# Patient Record
Sex: FEMALE | Race: BLACK OR AFRICAN AMERICAN | Employment: FULL TIME | URBAN - METROPOLITAN AREA
[De-identification: names, ages, dates, MRNs, and addresses within clinical notes are randomized per-mention and may not be internally consistent; named-entity substitution may affect disease eponyms.]

---

## 2017-01-30 ENCOUNTER — HOSPITAL ENCOUNTER (OUTPATIENT)
Dept: LAB | Age: 27
Discharge: HOME | End: 2017-01-30
Payer: OTHER GOVERNMENT

## 2017-01-30 ENCOUNTER — OFFICE VISIT (OUTPATIENT)
Dept: OBGYN CLINIC | Age: 27
End: 2017-01-30

## 2017-01-30 VITALS
BODY MASS INDEX: 38.64 KG/M2 | DIASTOLIC BLOOD PRESSURE: 88 MMHG | HEART RATE: 89 BPM | SYSTOLIC BLOOD PRESSURE: 148 MMHG | HEIGHT: 62 IN | WEIGHT: 210 LBS

## 2017-01-30 DIAGNOSIS — N93.9 ABNORMAL UTERINE BLEEDING (AUB): ICD-10-CM

## 2017-01-30 DIAGNOSIS — Z31.69 INFERTILITY COUNSELING: Primary | ICD-10-CM

## 2017-01-30 DIAGNOSIS — Z31.69 INFERTILITY COUNSELING: ICD-10-CM

## 2017-01-30 LAB
T4 FREE SERPL-MCNC: 1.4 NG/DL (ref 0.7–1.5)
TSH SERPL DL<=0.05 MIU/L-ACNC: 0.57 UIU/ML (ref 0.36–3.74)

## 2017-01-30 PROCEDURE — 84439 ASSAY OF FREE THYROXINE: CPT | Performed by: OBSTETRICS & GYNECOLOGY

## 2017-01-30 PROCEDURE — 84146 ASSAY OF PROLACTIN: CPT | Performed by: OBSTETRICS & GYNECOLOGY

## 2017-01-30 PROCEDURE — 83001 ASSAY OF GONADOTROPIN (FSH): CPT | Performed by: OBSTETRICS & GYNECOLOGY

## 2017-01-30 PROCEDURE — 82670 ASSAY OF TOTAL ESTRADIOL: CPT | Performed by: OBSTETRICS & GYNECOLOGY

## 2017-01-30 PROCEDURE — 36415 COLL VENOUS BLD VENIPUNCTURE: CPT | Performed by: OBSTETRICS & GYNECOLOGY

## 2017-01-30 NOTE — PATIENT INSTRUCTIONS
Infertility: Care Instructions  Your Care Instructions  Infertility means that you have not been able to get pregnant after trying for at least 1 year. It does not mean you will never get pregnant. A woman's chances of getting pregnant are higher when she is younger. A woman is most able to get pregnant (fertile) in her late 25s. Then, in her mid-30s, she becomes less fertile. This is because her eggs get older. If you are younger than 28, you may want to give yourself more time to get pregnant. If you are 28 or older, you may want to start treatment. It can help to learn more about when you have the best chance of getting pregnant. For most women, there are five days a month when they are most likely to get pregnant. This is the time when an egg is released. This is called ovulation. Ovulation usually happens 12 to 16 days before your next period begins. You can figure out when you ovulate if you write down for a few months when you start and end your periods. Then you can count how many days are between the first day of your periods. This amount of time is called your cycle. The average cycle is 28 days. But some women have cycles that are a little shorter or longer. After you know how long your cycle is, you can predict when your next period will start. And then, you can count backward from that day to know when you will ovulate next. Your doctor may also suggest a home ovulation test. This test can tell you when you are ovulating. Infertility can be caused by a problem with the reproductive organs of a woman, a man, or both. Your doctor can help you find out what kind of problem you may have. It's important to talk about testing and treatment choices with your doctor. If you choose to do some tests, you will probably start with a hormone test. This is a test for both of you. And then the man will probably have a semen test.  Follow-up care is a key part of your treatment and safety.  Be sure to make and go to all appointments, and call your doctor if you are having problems. It's also a good idea to know your test results and keep a list of the medicines you take. How can you care for yourself at home? For women  · Take a multivitamin with folic acid. This helps to prevent birth defects if you do become pregnant. · Get regular exercise. But do not overdo it. Really hard and long exercise can cause you to release an egg less often. · Eat healthy foods. And drink lots of water. · Stay at a healthy weight. This will increase your chances of getting pregnant. Women who weigh too much or too little can be less fertile. · Talk to your doctor about all medicines you are taking or may take. This includes over-the-counter and prescribed medicines and herbal remedies. Some medicines interfere with pregnancy. · Write down when your period starts and stops for a few months. Bring that information to your doctor. He or she can help you figure out when you ovulate and are most likely to get pregnant if you have sex. Or you may prefer to use a home ovulation test.  For men  · Avoid hot tubs and saunas. · If you get sick and have a fever, try to control your fever. A high fever may reduce your sperm count for months. · If you exercise very hard most days of the week, reduce how much exercise you do. Hard, long exercise may lower your sperm count. · Eat a healthy diet and stay at a healthy weight. Limit alcohol to 2 drinks a day. For both men and women  · If the woman knows when she will ovulate, try to have sex once a day for the 4 days before ovulation and on the day of ovulation. If the man has a low sperm count, have sex every other day. · If the woman does not know when she will ovulate, have sex 2 or 3 times each week. · Don't use lubricants during sex. They may affect how well sperm can travel to meet the woman's egg. · Avoid smoking and illegal drugs. When should you call for help?   Watch closely for changes in your health, and be sure to contact your doctor if:  · You want to try other treatments for infertility. Where can you learn more? Go to http://jaime-baldo.info/. Enter 977 [id number] in the search box to learn more about \"Infertility: Care Instructions. \"  Current as of: May 30, 2016  Content Version: 11.1  © 3950-9975 [employer]. Care instructions adapted under license by [employer] (which disclaims liability or warranty for this information). If you have questions about a medical condition or this instruction, always ask your healthcare professional. [place] [address] any warranty or liability for your use of this information.

## 2017-01-30 NOTE — MR AVS SNAPSHOT
Visit Information Date & Time Provider Department Dept. Phone Encounter #  
 1/30/2017  2:45 PM Jorden Vargas, [facility] [id number] Follow-up Instructions Return in about 4 weeks (around 2/27/2017), or if symptoms worsen or fail to improve. Upcoming Health Maintenance Date Due  
 HPV AGE 9Y-34Y (1 of 3 - Female 3 Dose Series) 8/28/2002 INFLUENZA AGE 9 TO ADULT 8/1/2016 PAP AKA CERVICAL CYTOLOGY 4/17/2018 Allergies as of 1/30/2017  Review Complete On: 1/30/2017 By: Jorden Vargas DO No Known Allergies Current Immunizations  Never Reviewed No immunizations on file. Not reviewed this visit You Were Diagnosed With   
  
 Codes Comments Infertility counseling    -  Primary ICD-10-CM: Q47.69 ICD-9-CM: V26.49 Abnormal uterine bleeding (AUB)     ICD-10-CM: N93.9 ICD-9-CM: 626.9 Vitals BP Pulse Height(growth percentile) Weight(growth percentile) LMP BMI  
 148/88 89 5' 2\" (1.575 m) 210 lb (95.3 kg) 01/14/2017 38.41 kg/m2 OB Status Smoking Status Having regular periods Light Tobacco Smoker Vitals History BMI and BSA Data Body Mass Index Body Surface Area  
 38.41 kg/m 2 2.04 m 2 Preferred Pharmacy Pharmacy Name Phone [facility] - [address], [address] [phone] Your Updated Medication List  
  
   
This list is accurate as of: 1/30/17  3:29 PM.  Always use your most recent med list.  
  
  
  
  
 multivitamin, tx-iron-ca-min 9 mg iron-400 mcg Tab tablet Commonly known as:  THERA-M w/ IRON Take 1 Tab by mouth daily. Follow-up Instructions Return in about 4 weeks (around 2/27/2017), or if symptoms worsen or fail to improve. To-Do List   
 01/30/2017 Lab:  ESTRADIOL   
  
 01/30/2017 Lab:  [facility] AND [address]   
  
 01/30/2017 Lab:  PROLACTIN   
  
 01/30/2017 Lab:  TSH AND FREE T4   
  
 01/30/2017 Imaging:  US PELV NON OB W TV   
  
  
Patient Instructions Infertility: Care Instructions Your Care Instructions Infertility means that you have not been able to get pregnant after trying for at least 1 year. It does not mean you will never get pregnant. A woman's chances of getting pregnant are higher when she is younger. A woman is most able to get pregnant (fertile) in her late 25s. Then, in her mid-30s, she becomes less fertile. This is because her eggs get older. If you are younger than 28, you may want to give yourself more time to get pregnant. If you are 28 or older, you may want to start treatment. It can help to learn more about when you have the best chance of getting pregnant. For most women, there are five days a month when they are most likely to get pregnant. This is the time when an egg is released. This is called ovulation. Ovulation usually happens 12 to 16 days before your next period begins. You can figure out when you ovulate if you write down for a few months when you start and end your periods. Then you can count how many days are between the first day of your periods. This amount of time is called your cycle. The average cycle is 28 days. But some women have cycles that are a little shorter or longer. After you know how long your cycle is, you can predict when your next period will start. And then, you can count backward from that day to know when you will ovulate next. Your doctor may also suggest a home ovulation test. This test can tell you when you are ovulating. Infertility can be caused by a problem with the reproductive organs of a woman, a man, or both. Your doctor can help you find out what kind of problem you may have. It's important to talk about testing and treatment choices with your doctor. If you choose to do some tests, you will probably start with a hormone test. This is a test for both of you.  And then the man will probably have a semen test. 
 Follow-up care is a key part of your treatment and safety. Be sure to make and go to all appointments, and call your doctor if you are having problems. It's also a good idea to know your test results and keep a list of the medicines you take. How can you care for yourself at home? For women · Take a multivitamin with folic acid. This helps to prevent birth defects if you do become pregnant. · Get regular exercise. But do not overdo it. Really hard and long exercise can cause you to release an egg less often. · Eat healthy foods. And drink lots of water. · Stay at a healthy weight. This will increase your chances of getting pregnant. Women who weigh too much or too little can be less fertile. · Talk to your doctor about all medicines you are taking or may take. This includes over-the-counter and prescribed medicines and herbal remedies. Some medicines interfere with pregnancy. · Write down when your period starts and stops for a few months. Bring that information to your doctor. He or she can help you figure out when you ovulate and are most likely to get pregnant if you have sex. Or you may prefer to use a home ovulation test. 
For men · Avoid hot tubs and saunas. · If you get sick and have a fever, try to control your fever. A high fever may reduce your sperm count for months. · If you exercise very hard most days of the week, reduce how much exercise you do. Hard, long exercise may lower your sperm count. · Eat a healthy diet and stay at a healthy weight. Limit alcohol to 2 drinks a day. For both men and women · If the woman knows when she will ovulate, try to have sex once a day for the 4 days before ovulation and on the day of ovulation. If the man has a low sperm count, have sex every other day. · If the woman does not know when she will ovulate, have sex 2 or 3 times each week. · Don't use lubricants during sex. They may affect how well sperm can travel to meet the woman's egg. · Avoid smoking and illegal drugs. When should you call for help? Watch closely for changes in your health, and be sure to contact your doctor if: 
· You want to try other treatments for infertility. Where can you learn more? Go to http://jaime-baldo.info/. Enter 580 [id number] in the search box to learn more about \"Infertility: Care Instructions. \" Current as of: May 30, 2016 Content Version: 11.1 © 6319-8060 [employer]. Care instructions adapted under license by [employer] (which disclaims liability or warranty for this information). If you have questions about a medical condition or this instruction, always ask your healthcare professional. [address] any warranty or liability for your use of this information. Introducing hospitals & HEALTH SERVICES! Felisha Marcelo introduces Allvoices patient portal. Now you can access parts of your medical record, email your doctor's office, and request medication refills online. 1. In your internet browser, go to [email or web address] 2. Click on the First Time User? Click Here link in the Sign In box. You will see the New Member Sign Up page. 3. Enter your Allvoices Access Code exactly as it appears below. You will not need to use this code after youve completed the sign-up process. If you do not sign up before the expiration date, you must request a new code. · All[employer]ices Access Code: [email or web address] Expires: 4/30/2017  3:29 PM 
 
4. Enter the last four digits of your Social Security Number (xxxx) and Date of Birth (mm/dd/yyyy) as indicated and click Submit. You will be taken to the next sign-up page. 5. Create a [employer]Log[employer] ID. This will be your Allvoices login ID and cannot be changed, so think of one that is secure and easy to remember. 6. Create a Allvoices password. You can change your password at any time. 7. Enter your Password Reset Question and Answer.  This can be used at a later time if you forget your password. 8. Enter your e-mail address. You will receive e-mail notification when new information is available in [address] 9. Click Sign Up. You can now view and download portions of your medical record. 10. Click the Download Summary menu link to download a portable copy of your medical information. If you have questions, please visit the Frequently Asked Questions section of the [employer] website. Remember, [employer] is NOT to be used for urgent needs. For medical emergencies, dial 911. Now available from your iPhone and Android! Please provide this summary of care documentation to your next provider. If you have any questions after today's visit, please call [phone].
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
## 2017-01-31 LAB — ESTRADIOL SERPL-MCNC: 33.4 PG/ML

## 2017-02-01 LAB
FSH SERPL-ACNC: 7.3 MIU/ML
LH SERPL-ACNC: 12.3 MIU/ML
PROLACTIN SERPL-MCNC: 7 NG/ML

## 2017-02-04 ENCOUNTER — HOSPITAL ENCOUNTER (OUTPATIENT)
Dept: ULTRASOUND IMAGING | Age: 27
Discharge: HOME | End: 2017-02-04
Attending: OBSTETRICS & GYNECOLOGY
Payer: OTHER GOVERNMENT

## 2017-02-04 DIAGNOSIS — Z31.69 INFERTILITY COUNSELING: ICD-10-CM

## 2017-02-04 PROCEDURE — 76830 TRANSVAGINAL US NON-OB: CPT
